# Patient Record
(demographics unavailable — no encounter records)

---

## 2024-12-12 NOTE — HISTORY OF PRESENT ILLNESS
[de-identified] : 12/12/24 . left knee pain.  has had gel injections multiple times- last 9/2024- which helped. pain 3/10.  has been going to the gym doing HEP multiple times a week. has CSI 5/24.  using heat and ice, wearing knee sleeve.  uses Meloxicam for bad pain.  on xarelto for hx of blood clots. Patient reports Left Knee Arthroscopy 2010 07/16/24 The patient is a 56 year old [RIGHT] hand dominant male who presents today complaining of LT knee.   Date of Injury/Onset:  few weeks ago Pain:    At Rest: 5/10  With Activity:  8/10  Mechanism of injury: insidious  This is NOT a Work Related Injury being treated under Worker's Compensation. This is NOT an athletic injury occurring associated with an interscholastic or organized sports team. Quality of symptoms: swelling, sharp  Improves with: nothing helps  Worse with: walking, stairs, bending, kneeling  Prior treatment: pain management- gel injection 6/25/24, CSI 1 month ago Prior Imaging: MRI [facility]  Out of work/sport: _, since _ School/Sport/Position/Occupation: teacher, off for the summer  Additional Information: hx of arthroscopic sx on the left knee in 2006

## 2024-12-12 NOTE — PHYSICAL EXAM
[5___] : hamstring 5[unfilled]/5 [] : extensor lag [Left] : left knee [AP] : anteroposterior [Lateral] : lateral [[place]] : skyline [AP Standing] : anteroposterior standing [de-identified] : Varus thrust [FreeTextEntry9] : Medial joint space narrowing. Sclerosis of the medial knee. Varus deformity. Severe DJD medial compartment. Tricompartmental osteophyte formation. No fractures seen. Abnormal Tibial plateau, with increase medial and lateral slope and tibial spine peak. [TWNoteComboBox7] : flexion 95 degrees [de-identified] : extension 7 degrees

## 2024-12-12 NOTE — DISCUSSION/SUMMARY
[de-identified] : The patient was advised of the diagnosis. The natural history of the pathology was explained in full to the patient in layman's terms. Several different treatment options were discussed and explained in full to the patient including the risks and benefits of both surgical and non-surgical treatments. All questions and concerns were answered.   Lengthy discussion regarding options was had with the patient. Nonsurgical options including but not limited to cortisone, Visco supplementation, Prescription anti-inflammatory medications (both steroidal and non-steroidal), activity modification, non-impact exercise, maintaining a healthy BMI, bracing, and icing were reviewed. Surgical options including but not limited to arthroscopy, and joint replacement were discussed as was risks, benefits and alternatives of all the proposed treatments. Discussed also with the patient that they could also delay any immediate treatment options, and continue to observe and self care for the discussed problem. Discussed HEP as well as Rest, Ice and elevation. Patient had ample time to ask any questions about todays visit and the diagnosis, and all questions were answered. Patient understands the plan going forward. I spent time going in detail the problem and the associated risks/benefits of surgery. Went into detailed discussion of complications including but not limited to, nerve injury, non-union, repeat fracture, DVT /PE /pe postop, instability, transfusion, infection, NVA injury, stiffness, leg length discrepancy, inability to ambulate & death. Discussed implant and model shown to patient. Patient had ample time to ask any questions, and at this time answered all patient questions, should anymore arise they were instructed to follow up. Patient expressed understanding of the proposed procedure and postop directions.    At this time after discussion of the options, the patient would benefit from organized Physical Therapy to increase overall functionality. The patient was provided with an Rx in office today and was instructed to attend PT for 6-8 weeks in order to increase strength and ROM. Patient agreed with this plan and will begin PT as soon as they can. Patient was previously provided a prescription for Meloxicam 15mg. Patient was instructed to take one as needed for pain. There is a moderate risk of morbidity  from use of prescription strength medications and possible side effects of these medications which include upset stomach and cardiac/renal issues with long term use. I recommended that the patient follow-up with their medical physician to discuss any significant specific potential issues with long term medication use such as interactions with current medications or with exacerbation of underlying medical comorbidities. The benefits and risks associated with use of oral prescription and over the counter anti-inflammatory medications were discussed with the patient. The patient voiced understanding of the risks including but not limited to bleeding, stroke, kidney dysfunction, heart disease.   f/u 3 months